# Patient Record
Sex: MALE | Race: WHITE | Employment: FULL TIME | URBAN - NONMETROPOLITAN AREA
[De-identification: names, ages, dates, MRNs, and addresses within clinical notes are randomized per-mention and may not be internally consistent; named-entity substitution may affect disease eponyms.]

---

## 2020-02-03 ENCOUNTER — HOSPITAL ENCOUNTER (EMERGENCY)
Age: 30
Discharge: HOME | End: 2020-02-03

## 2020-02-03 VITALS
OXYGEN SATURATION: 98 % | RESPIRATION RATE: 16 BRPM | WEIGHT: 160 LBS | TEMPERATURE: 98.6 F | HEART RATE: 87 BPM | BODY MASS INDEX: 24.25 KG/M2 | SYSTOLIC BLOOD PRESSURE: 131 MMHG | DIASTOLIC BLOOD PRESSURE: 87 MMHG | HEIGHT: 68 IN

## 2020-02-03 PROCEDURE — 99202 OFFICE O/P NEW SF 15 MIN: CPT | Performed by: NURSE PRACTITIONER

## 2020-02-03 PROCEDURE — 99202 OFFICE O/P NEW SF 15 MIN: CPT

## 2020-02-03 RX ORDER — PENICILLIN V POTASSIUM 500 MG/1
500 TABLET ORAL 4 TIMES DAILY
Qty: 40 TABLET | Refills: 0 | Status: SHIPPED | OUTPATIENT
Start: 2020-02-03 | End: 2020-02-13

## 2020-02-03 RX ORDER — NAPROXEN 500 MG/1
500 TABLET ORAL 2 TIMES DAILY WITH MEALS
Qty: 20 TABLET | Refills: 0 | Status: SHIPPED | OUTPATIENT
Start: 2020-02-03

## 2020-02-03 RX ORDER — LIDOCAINE HYDROCHLORIDE 20 MG/ML
1 SOLUTION OROPHARYNGEAL PRN
Qty: 15 ML | Refills: 0 | Status: SHIPPED | OUTPATIENT
Start: 2020-02-03

## 2020-02-03 ASSESSMENT — PAIN SCALES - GENERAL: PAINLEVEL_OUTOF10: 9

## 2020-02-03 ASSESSMENT — PAIN DESCRIPTION - ORIENTATION: ORIENTATION: RIGHT

## 2020-02-03 ASSESSMENT — PAIN DESCRIPTION - PAIN TYPE: TYPE: ACUTE PAIN

## 2020-02-03 ASSESSMENT — ENCOUNTER SYMPTOMS
FACIAL SWELLING: 1
COUGH: 0

## 2020-02-03 ASSESSMENT — PAIN DESCRIPTION - LOCATION: LOCATION: JAW

## 2020-02-03 NOTE — ED PROVIDER NOTES
well-developed and well-groomed. HENT:      Head: Normocephalic and atraumatic. Jaw: Swelling (right) present. Mouth/Throat:      Lips: Pink. Mouth: Mucous membranes are moist.      Dentition: Abnormal dentition. Pharynx: Oropharynx is clear. Eyes:      Conjunctiva/sclera:      Right eye: Right conjunctiva is not injected. Left eye: Left conjunctiva is not injected. Pupils: Pupils are equal.   Neck:      Musculoskeletal: Normal range of motion. Cardiovascular:      Rate and Rhythm: Normal rate. Pulmonary:      Effort: Pulmonary effort is normal.   Lymphadenopathy:      Cervical: No cervical adenopathy. Skin:     General: Skin is warm and dry. Findings: No rash (on exposed surfaces). Neurological:      Mental Status: He is alert and oriented to person, place, and time. Psychiatric:         Speech: Speech normal.         Behavior: Behavior is cooperative. DIAGNOSTIC RESULTS   Labs:No results found for this visit on 02/03/20. IMAGING:  No orders to display     URGENT CARE COURSE:     Vitals:    02/03/20 1158   BP: 131/87   Pulse: 87   Resp: 16   Temp: 98.6 °F (37 °C)   SpO2: 98%   Weight: 160 lb (72.6 kg)   Height: 5' 8\" (1.727 m)       Medications - No data to display  PROCEDURES:  FINALIMPRESSION      1. Pain, dental        DISPOSITION/PLAN   DISPOSITION Decision To Discharge 02/03/2020 12:03:34 PM    Physical assessment findings, diagnostic testing(s) if applicable, and vital signs reviewed with patient/patient representative. Questions answered. If applicable, patient/patient representative will be contacted upon receipt of final culture and sensitivity or other testing results when available. Any additions or changes to medications or changes the plan of care will be made at that time. Medications as directed, including OTC medications for supportive care. Education provided on medications.   Differential diagnosis(s) discussed with patient/patient representative. Home care/self care instructions reviewed with patient/patient representative. Patient is to follow-up with family care provider in 2-3 days if no improvement. Patient is to go to the emergency department if symptoms worsen. Patient/patient representative is aware of care plan, questions answered, verbalizes understanding and is in agreement. Teach back method used for patient/patient representative teaching(s) and printed instructions attached to after visit summary. Problem List Items Addressed This Visit     None      Visit Diagnoses     Pain, dental    -  Primary    Relevant Medications    penicillin v potassium (VEETID) 500 MG tablet    lidocaine viscous hcl (XYLOCAINE) 2 % SOLN solution    naproxen (NAPROSYN) 500 MG tablet          PATIENT REFERRED TO:  [address] [facility].  [address] [address] [facility] [address] [facility]  Schedule an appointment as soon as possible for a visit in 3 days  if you do not have a family provider      Dentist as soon as possible          Sharee Pacheco, [address], APRN - CNP  02/03/20 9809

## 2020-02-03 NOTE — ED TRIAGE NOTES
Pt complains of dental swelling and pain that started 2 days ago and is getting worse. [place] have a dentist here. Informed he will need to find one while here.

## 2020-02-03 NOTE — ED NOTES
Pt verbalized discharge instructions. Pt informed to go to ER if develop chest pain, shortness of breath or abdominal pain. Pt ambulatory out in stable condition. Assessment unchanged.        Flakita Hamlin RN  02/03/20 1838